# Patient Record
Sex: FEMALE | Race: BLACK OR AFRICAN AMERICAN | NOT HISPANIC OR LATINO | Employment: FULL TIME | URBAN - METROPOLITAN AREA
[De-identification: names, ages, dates, MRNs, and addresses within clinical notes are randomized per-mention and may not be internally consistent; named-entity substitution may affect disease eponyms.]

---

## 2022-01-12 PROBLEM — M54.12 CERVICAL RADICULOPATHY: Status: ACTIVE | Noted: 2022-01-12

## 2022-02-01 PROBLEM — I10 HYPERTENSION: Status: ACTIVE | Noted: 2022-02-01

## 2022-02-01 PROBLEM — E03.9 HYPOTHYROIDISM: Status: ACTIVE | Noted: 2022-02-01

## 2022-03-30 DIAGNOSIS — M48.02 SPINAL STENOSIS IN CERVICAL REGION: Primary | ICD-10-CM

## 2022-03-31 ENCOUNTER — CLINICAL SUPPORT (OUTPATIENT)
Dept: REHABILITATION | Facility: HOSPITAL | Age: 67
End: 2022-03-31
Payer: COMMERCIAL

## 2022-03-31 DIAGNOSIS — M48.02 SPINAL STENOSIS IN CERVICAL REGION: ICD-10-CM

## 2022-03-31 PROCEDURE — 97161 PT EVAL LOW COMPLEX 20 MIN: CPT

## 2022-03-31 PROCEDURE — 97110 THERAPEUTIC EXERCISES: CPT

## 2022-03-31 NOTE — PLAN OF CARE
[facility]   Physical Therapy Initial Evaluation     Date: 3/31/2022   Name: Stella Tariq  Clinic Number: [id number]    Therapy Diagnosis:   Encounter Diagnosis   Name Primary?    Spinal stenosis in cervical region      Physician: Cuong Almendarez    Physician Orders: PT Eval and Treat   Medical Diagnosis from Referral: M48.02 (ICD-10-CM) - Spinal stenosis in cervical region  Evaluation Date: 3/31/2022  Authorization Period Expiration: 5/1/22  Plan of Care Expiration: 5/31/22  Progress Note Due: 4/31/22  Visit # / Visits authorized: 1/ 1   FOTO: 1/5    Precautions: HTN, no lifting >10#    Time In: 2:15pm  Time Out: 3:00pm  Total Appointment Time (timed & untimed codes): 45 minutes      SUBJECTIVE   Date of onset: 2/1/22    History of current condition - Stella reports: she had C4-6 ACDF on Feb 1, 2022. She reports she still has some radicular pain in distal left upper extremity. Her arm hurts mostly at night and occasionally during the day. She has difficltuy with reaching overhead to dress herself. Her  [place] [employer] She is able to do some laundry, but not sweeping or mopping. She changes her bedding in steps to avoid overuse of her arms.     Falls: n/a    Imaging, none available    Prior Therapy: none  Social History:  lives with their spouse  Occupation: n/a  Prior Level of Function: (I)  Current Level of Function: difficulty with dressing, hygiene, ADLs, unable to drive at this time    Pain:  Current 2/10, worst 4/10, best 0/10   Location: left  UE  Description: Sharp and pulling  Aggravating Factors: reaching overhead/behind head  Easing Factors: rest    Patients goals: to be able to do more for myself and be more mobile.      Medical History:   Past Medical History:   Diagnosis Date    Arthritis     Breast cancer 2019    Cataract 2019/2021    bilateral extraction    Hypertension     Thyroid disease        Surgical History:   Stella Tariq  has a past surgical  history that includes Neck surgery; Hysterectomy; Cholecystectomy; Surgical removal of lymph node; Breast lumpectomy (2019); Bilateral tubal ligation; Eye surgery; Anterior cervical discectomy w/ fusion (N/A, 2/1/2022); and Bone graft (N/A, 2/1/2022).    Medications:   Stella has a current medication list which includes the following prescription(s): amlodipine, aspirin, ergocalciferol (vitamin d2), gabapentin, hydrochlorothiazide, ketorolac 0.5%, levothyroxine, lisinopril-hydrochlorothiazide, ofloxacin, and prednisolone acetate.    Allergies:   Review of patient's allergies indicates:  No Known Allergies     Objective     POSTURE    Postural examination/scapula alignment: Rounded shoulder, Head forward and Slouched posture          NEUROLOGICAL SCREENING     Palpation: TTP L posterior shoulder   Sensation: intact light touch BUE     Range of Motion/Strength:     CERVICAL AROM Pain/Dysfunction with Movement   Right rotation 60 deg Pain free   Left rotation 55 deg  Pain free     Thoracolumbar AROM Pain/Dysfunction with Movement   Right rotation 75% limited  Cervical pain    Left rotation 75% limited  Cervical pain      Shoulder Right MMT Left MMT Pain/Dysfunction with Movement (pain=!)   AROM        flexion  WFL 4-/5  WFL 3/5 Painful left upper extremity AROM and MMT            abduction  WFL 4-/5  WFL 3/5    Internal rotation  WFL 3+/5  WFL 3/5    ER at 0° abd  WFL 4-/5  WFL 3/5    rhomboids  3-/5  3-/5    Mid trap  3-/5  3-/5    Lower trap   3-/5  3-/5      Elbow Right MMT Left MMT Pain/Dysfunction with Movement (pain=!)   AROM        flexion  WFL 4-/5  WFL 3+/5    extension  WFL 4-/5  WFL 3+/5        CMS Impairment/Limitation/Restriction for FOTO Neck Survey    Therapist reviewed FOTO scores for Stella Tariq on 3/31/2022.   FOTO documents entered into EPIC - see Media section.    Limitation Score: 56%  Category: Mobility         TREATMENT     Total Treatment time (time-based codes) separate from Evaluation: 10  "minutes      Stella received the treatments listed below:      therapeutic exercises to develop strength, endurance, ROM, flexibility and posture for 10 minutes including:  HEP instruction & return demonstration     left upper extremity table slides  scap sets 5" holds  BUE external rotations with scap sets 5" holds     PATIENT EDUCATION AND HOME EXERCISES     Education provided:   - HEP  -purpose of PT    Written Home Exercises Provided: yes. Exercises were reviewed and Stella was able to demonstrate them prior to the end of the session.  Stella demonstrated good  understanding of the education provided. See EMR under Patient Instructions for exercises provided during therapy sessions.    ASSESSMENT     Stella is a 67 y.o. female referred to outpatient Physical Therapy with a medical diagnosis of spinal stenosis of cervical region s/p C4-6 ACDF. Patient presents with impaired resting posture, left upper extremity radicular pain and gross left upper extremity weakness affecting her functional mobility.     Patient prognosis is Good.   Patientt will benefit from skilled outpatient Physical Therapy to address the deficits stated above and in the chart below, provide patient /family education, and to maximize patientt's level of independence.     Plan of care discussed with patient: Yes  Patient's spiritual, cultural and educational needs considered and patient is agreeable to the plan of care and goals as stated below:     Anticipated Barriers for therapy: none    Medical Necessity is demonstrated by the following  History  Co-morbidities and personal factors that may impact the plan of care Co-morbidities:   see EMR    Personal Factors:   no deficits     low   Examination  Body Structures and Functions, activity limitations and participation restrictions that may impact the plan of care Body Regions:   neck    Body Systems:    gross symmetry  ROM  strength  gross coordinated movement  motor control  motor " learning    Participation Restrictions:   none    Activity limitations:   Learning and applying knowledge  no deficits    General Tasks and Commands  no deficits    Communication  no deficits    Mobility  lifting and carrying objects  fine hand use (grasping/picking up)    Self care  washing oneself (bathing, drying, washing hands)  caring for body parts (brushing teeth, shaving, grooming)  dressing    Domestic Life  shopping  cooking  doing house work (cleaning house, washing dishes, laundry)    Interactions/Relationships  family relationships    Life Areas  no deficits    Community and Social Life  recreation and leisure         moderate   Clinical Presentation stable and uncomplicated low   Decision Making/ Complexity Score: low     Goals:  Short Term Goals: 6 visits  1. Pt will be compliant /c HEP to supplement PT in order to improve functional tasks  2. Pt will improve B marti-scaps MMTs to 3/5 grossly to improve strength for functional activities  3. Pt will improve thoracic rotation B to lacking 50% for improved mobility with functional activities     Long Term Goals: 12 visits   1. Pt will improve B marti-scaps MMTs to 3+/5 grossly to improve strength for functional activities  2. Pt will improve FOTO score to </= 49% limitation to reduce perceived pain with mobility   3. Pt will improve thoracic rotation to lacking 25% for improved mobility with functional activities       PLAN   Plan of care Certification: 3/31/2022 to 6/31/22    Outpatient Physical Therapy 2 times weekly for  12 visit to include the following interventions: Manual Therapy, Moist Heat/ Ice, Neuromuscular Re-ed, Patient Education, Self Care, Therapeutic Activities and Therapeutic Exercise, E-stim & Dry Needling as needed.     Nga Mcfadden, PT      I CERTIFY THE NEED FOR THESE SERVICES FURNISHED UNDER THIS PLAN OF TREATMENT AND WHILE UNDER MY CARE   Physician's comments:     Physician's Signature:  ___________________________________________________

## 2022-04-05 ENCOUNTER — CLINICAL SUPPORT (OUTPATIENT)
Dept: REHABILITATION | Facility: HOSPITAL | Age: 67
End: 2022-04-05
Payer: COMMERCIAL

## 2022-04-05 DIAGNOSIS — R29.898 SHOULDER WEAKNESS: ICD-10-CM

## 2022-04-05 DIAGNOSIS — R29.3 POSTURE ABNORMALITY: ICD-10-CM

## 2022-04-05 DIAGNOSIS — M54.12 CERVICAL RADICULOPATHY: ICD-10-CM

## 2022-04-05 PROCEDURE — 97140 MANUAL THERAPY 1/> REGIONS: CPT

## 2022-04-05 PROCEDURE — 97110 THERAPEUTIC EXERCISES: CPT

## 2022-04-05 NOTE — PROGRESS NOTES
"[facility]   Physical Therapy Treatment Note     Name: Stella Tariq  Clinic Number: [id number]    Therapy Diagnosis:   Encounter Diagnoses   Name Primary?    Cervical radiculopathy     Posture abnormality     Shoulder weakness      Physician: Cuong Almendarez    Visit Date: 4/5/2022    Physician Orders: PT Eval and Treat   Medical Diagnosis from Referral: M48.02 (ICD-10-CM) - Spinal stenosis in cervical region  Evaluation Date: 3/31/2022  Authorization Period Expiration: 5/1/22  Plan of Care Expiration: 5/31/22  Progress Note Due: 4/31/22  Visit # / Visits authorized: 2/20  FOTO: 2/5     Precautions: HTN, no lifting >10#    PTA Visit #: 0/5     Time In: 9:46am  Time Out: 10:31am  Total Billable Time: 45 minutes    SUBJECTIVE     Pt reports: she is still having some difficulty sleeping, but it is getting better. She had no trouble with her HEP  She was compliant with home exercise program.  Response to previous treatment: no adverse effects  Functional change: none    Pain: 2/10  Location: bilateral shoulders (marti-scap)      OBJECTIVE     Objective Measures updated at progress report unless specified.     Treatment     Stella received the treatments listed below:      therapeutic exercises to develop strength, endurance, ROM, flexibility and posture for 35 minutes including:    UE pulleys fle/abd 3' ea  fwd upper extremity table slides x15 B   scap sets 5" holds  BUE external rotations with scap sets 5" holds   Supine pec stretch 2'   Supine snow angels B x10   Supine SA punch x15 B   upper trap stretch B 20" x1    Next: Tball roll outs    manual therapy techniques:  were applied  for 10 minutes, including:  TPR B levator scaps/ mid traps        Patient Education and Home Exercises     Home Exercises Provided and Patient Education Provided     Education provided:   - HEP    Written Home Exercises Provided: yes. Exercises were reviewed and Stella was able to demonstrate them prior " to the end of the session.  Stella demonstrated good  understanding of the education provided. See EMR under Patient Instructions for exercises provided during therapy sessions    ASSESSMENT     Pt tolerated first full PT session well. PT added B shoulder range of motion and marti-scap strengthening therex for improved resting posture, which pt did demonstrate at end of session. Pt c/o posterior neck pulling pain with attempt at R upper trap stretch. Following manual trigger point release, she reported reduced pain/tension.     Stella Is progressing well towards her goals.   Pt prognosis is Good.     Pt will continue to benefit from skilled outpatient physical therapy to address the deficits listed in the problem list box on initial evaluation, provide pt/family education and to maximize pt's level of independence in the home and community environment.     Pt's spiritual, cultural and educational needs considered and pt agreeable to plan of care and goals.     Anticipated barriers to physical therapy: none    Goals: Short Term Goals: 6 visits  1. Pt will be compliant /c HEP to supplement PT in order to improve functional tasks  2. Pt will improve B marti-scaps MMTs to 3/5 grossly to improve strength for functional activities  3. Pt will improve thoracic rotation B to lacking 50% for improved mobility with functional activities      Long Term Goals: 12 visits   1. Pt will improve B marti-scaps MMTs to 3+/5 grossly to improve strength for functional activities  2. Pt will improve FOTO score to </= 49% limitation to reduce perceived pain with mobility   3. Pt will improve thoracic rotation to lacking 25% for improved mobility with functional activities     PLAN     Plan of care Certification: 3/31/2022 to 6/31/22     Outpatient Physical Therapy 2 times weekly for  12 visit to include the following interventions: Manual Therapy, Moist Heat/ Ice, Neuromuscular Re-ed, Patient Education, Self Care, Therapeutic Activities and  Therapeutic Exercise, E-stim & Dry Needling as needed.     Nga Mcfadden, PT

## 2022-04-07 ENCOUNTER — CLINICAL SUPPORT (OUTPATIENT)
Dept: REHABILITATION | Facility: HOSPITAL | Age: 67
End: 2022-04-07
Payer: COMMERCIAL

## 2022-04-07 DIAGNOSIS — R29.3 POSTURE ABNORMALITY: Primary | ICD-10-CM

## 2022-04-07 DIAGNOSIS — R29.898 SHOULDER WEAKNESS: ICD-10-CM

## 2022-04-07 PROCEDURE — 97110 THERAPEUTIC EXERCISES: CPT

## 2022-04-07 PROCEDURE — 97140 MANUAL THERAPY 1/> REGIONS: CPT

## 2022-04-07 NOTE — PROGRESS NOTES
"[facility]   Physical Therapy Treatment Note     Name: Stella Tariq  Clinic Number: [id number]    Therapy Diagnosis:   Encounter Diagnoses   Name Primary?    Posture abnormality Yes    Shoulder weakness      Physician: Cuong Almendarez    Visit Date: 4/7/2022    Physician Orders: PT Eval and Treat   Medical Diagnosis from Referral: M48.02 (ICD-10-CM) - Spinal stenosis in cervical region  Evaluation Date: 3/31/2022  Authorization Period Expiration: 5/1/22  Plan of Care Expiration: 5/31/22  Progress Note Due: 4/31/22  Visit # / Visits authorized: 2/20  FOTO: 2/5     Precautions: HTN, no lifting >10#    PTA Visit #: 0/5     Time In: 10:50 am  Time Out: 11:30 am  Total Billable Time: 40 minutes    SUBJECTIVE     Pt reports: min soreness after last PT session. Pt continues to report radicular pain along the L UE.   She was compliant with home exercise program.  Response to previous treatment: no adverse effects  Functional change: none    Pain: 5/10  Location: L UE    OBJECTIVE     Objective Measures updated at progress report unless specified.     Treatment     Stella received the treatments listed below:      therapeutic exercises to develop strength, endurance, ROM, flexibility and posture for 30 minutes including:    UE pulleys fle/abd 3' ea  Rows orange TB 10 X 2  Shoulder extension orange TB 10 X 2  BUE external rotations with scap sets 5" holds X 10  Supine pec stretch on half foam 2'   Supine snow angels B x15  Supine SA punch x20 B   upper trap stretch B 30" x 3  Tball roll outs 10'' X 5    manual therapy techniques:  were applied  for 10 minutes, including:  STM to cervical paraspinals, UT's, and LS's   TPR B levator scaps/ mid traps    Patient Education and Home Exercises     Home Exercises Provided and Patient Education Provided     Education provided:   - HEP    Written Home Exercises Provided: yes. Exercises were reviewed and Stella was able to demonstrate them prior to the " end of the session.  Stella demonstrated good  understanding of the education provided. See EMR under Patient Instructions for exercises provided during therapy sessions    ASSESSMENT     Pt was able to tolerate additional UE strengthening exercises as well as increased number of reps with already prescribed exercises without report of increase in pain or strain. Pt required VC and visual demonstration for all new exercises in order to ensure proper form necessary for increasing strengthen and flexibility. Mod tone noted along cervical spine during STM.     Stella Is progressing well towards her goals.   Pt prognosis is Good.     Pt will continue to benefit from skilled outpatient physical therapy to address the deficits listed in the problem list box on initial evaluation, provide pt/family education and to maximize pt's level of independence in the home and community environment.     Pt's spiritual, cultural and educational needs considered and pt agreeable to plan of care and goals.     Anticipated barriers to physical therapy: none    Goals: Short Term Goals: 6 visits  1. Pt will be compliant /c HEP to supplement PT in order to improve functional tasks  2. Pt will improve B marti-scaps MMTs to 3/5 grossly to improve strength for functional activities  3. Pt will improve thoracic rotation B to lacking 50% for improved mobility with functional activities      Long Term Goals: 12 visits   1. Pt will improve B marti-scaps MMTs to 3+/5 grossly to improve strength for functional activities  2. Pt will improve FOTO score to </= 49% limitation to reduce perceived pain with mobility   3. Pt will improve thoracic rotation to lacking 25% for improved mobility with functional activities     PLAN     Plan of care Certification: 3/31/2022 to 6/31/22     Outpatient Physical Therapy 2 times weekly for  12 visit to include the following interventions: Manual Therapy, Moist Heat/ Ice, Neuromuscular Re-ed, Patient Education, Self  Care, Therapeutic Activities and Therapeutic Exercise, E-stim & Dry Needling as needed.     Rubén Peralta, PT

## 2022-04-11 ENCOUNTER — CLINICAL SUPPORT (OUTPATIENT)
Dept: REHABILITATION | Facility: HOSPITAL | Age: 67
End: 2022-04-11
Payer: COMMERCIAL

## 2022-04-11 DIAGNOSIS — R29.3 POSTURE ABNORMALITY: Primary | ICD-10-CM

## 2022-04-11 DIAGNOSIS — R29.898 SHOULDER WEAKNESS: ICD-10-CM

## 2022-04-11 PROCEDURE — 97110 THERAPEUTIC EXERCISES: CPT

## 2022-04-11 PROCEDURE — 97140 MANUAL THERAPY 1/> REGIONS: CPT

## 2022-04-11 NOTE — PROGRESS NOTES
"[facility]   Physical Therapy Treatment Note     Name: Stella Tariq  Clinic Number: [id number]    Therapy Diagnosis:   No diagnosis found.  Physician: Cuong Almendarez    Visit Date: 4/11/2022    Physician Orders: PT Eval and Treat   Medical Diagnosis from Referral: M48.02 (ICD-10-CM) - Spinal stenosis in cervical region  Evaluation Date: 3/31/2022  Authorization Period Expiration: 5/1/22  Plan of Care Expiration: 5/31/22  Progress Note Due: 4/31/22  Visit # / Visits authorized: 3/20  FOTO: 3/5     Precautions: HTN, no lifting >10#    PTA Visit #: 0/5     Time In: 10:32 am  Time Out: 11:17am  Total Billable Time: 45 minutes    SUBJECTIVE     Pt reports: she felt much better after the manual treatment last session   She was compliant with home exercise program.  Response to previous treatment: no adverse effects  Functional change: none    Pain: 5/10  Location: L UE    OBJECTIVE     Objective Measures updated at progress report unless specified.     Treatment     Stella received the treatments listed below:      therapeutic exercises to develop strength, endurance, ROM, flexibility and posture for 30  minutes including:    UE pulleys fle/abd 3' ea  Rows orange TB 10 X 2  Shoulder extension orange TB 10 X 2  Supine SA punch 2x10  B   Supine bear hugs x10    not performed    BUE external rotations with scap sets 5" holds X 10  Supine pec stretch on half foam 2'   Supine snow angels B x15  upper trap stretch B 30" x 3  Tball roll outs 10'' X 5    manual therapy techniques:  were applied  for 15 minutes, including:  STM to cervical paraspinals, UT's, and LS's   TPR B levator scaps/ mid traps    Patient Education and Home Exercises     Home Exercises Provided and Patient Education Provided     Education provided:   - HEP  -log roll technique with bed mobility     Written Home Exercises Provided: yes. Exercises were reviewed and Stella was able to demonstrate them prior to the end of the " session.  Stella demonstrated good  understanding of the education provided. See EMR under Patient Instructions for exercises provided during therapy sessions    ASSESSMENT     Pt presents with hypertonicity of B upper trap and mid traps, although subjectively reduced tension since last visit. PT added bear hugs and pt required verbal/tactile cues for proper form with supine SA punches, for improve resting posture. She required education on log roll technique to avoid excessive strain on cervical flexors with bed mobility. Pt continues to c/o cervical pain with attempted R upper trap stretch (L sidebending), therefore this was avoided. She reported reduced cervical tension following manual treatment today.     Stella Is progressing well towards her goals.   Pt prognosis is Good.     Pt will continue to benefit from skilled outpatient physical therapy to address the deficits listed in the problem list box on initial evaluation, provide pt/family education and to maximize pt's level of independence in the home and community environment.     Pt's spiritual, cultural and educational needs considered and pt agreeable to plan of care and goals.     Anticipated barriers to physical therapy: none    Goals: Short Term Goals: 6 visits  1. Pt will be compliant /c HEP to supplement PT in order to improve functional tasks  2. Pt will improve B marti-scaps MMTs to 3/5 grossly to improve strength for functional activities  3. Pt will improve thoracic rotation B to lacking 50% for improved mobility with functional activities      Long Term Goals: 12 visits   1. Pt will improve B marti-scaps MMTs to 3+/5 grossly to improve strength for functional activities  2. Pt will improve FOTO score to </= 49% limitation to reduce perceived pain with mobility   3. Pt will improve thoracic rotation to lacking 25% for improved mobility with functional activities     PLAN     Plan of care Certification: 3/31/2022 to 6/31/22     Outpatient Physical  Therapy 2 times weekly for  12 visit to include the following interventions: Manual Therapy, Moist Heat/ Ice, Neuromuscular Re-ed, Patient Education, Self Care, Therapeutic Activities and Therapeutic Exercise, E-stim & Dry Needling as needed.     Nga Mcfadden, PT

## 2022-04-13 ENCOUNTER — CLINICAL SUPPORT (OUTPATIENT)
Dept: REHABILITATION | Facility: HOSPITAL | Age: 67
End: 2022-04-13
Payer: COMMERCIAL

## 2022-04-13 DIAGNOSIS — R29.898 SHOULDER WEAKNESS: ICD-10-CM

## 2022-04-13 DIAGNOSIS — R29.3 POSTURE ABNORMALITY: Primary | ICD-10-CM

## 2022-04-13 PROCEDURE — 97110 THERAPEUTIC EXERCISES: CPT | Mod: CQ

## 2022-04-13 PROCEDURE — 97140 MANUAL THERAPY 1/> REGIONS: CPT | Mod: CQ

## 2022-04-13 NOTE — PROGRESS NOTES
"[facility]   Physical Therapy Treatment Note     Name: Stella Tariq  Clinic Number: [id number]    Therapy Diagnosis:   No diagnosis found.  Physician: Cuong Almendarez    Visit Date: 4/13/2022    Physician Orders: PT Eval and Treat   Medical Diagnosis from Referral: M48.02 (ICD-10-CM) - Spinal stenosis in cervical region  Evaluation Date: 3/31/2022  Authorization Period Expiration: 5/1/22  Plan of Care Expiration: 5/31/22  Progress Note Due: 4/31/22  Visit # / Visits authorized: 3/20  FOTO: 3/5     Precautions: HTN, no lifting >10#    PTA Visit #: 1/5     Time In: 930 am  Time Out:1015 am  Total Billable Time: 45 minutes    SUBJECTIVE     Pt reports: she doesn't have soreness after therapy like she used to.  She was compliant with home exercise program.  Response to previous treatment: no adverse effects  Functional change: none    Pain: 0/10  Location: L UE    OBJECTIVE     Objective Measures updated at progress report unless specified.     Treatment     Stella received the treatments listed below:      therapeutic exercises to develop strength, endurance, ROM, flexibility and posture for 30  minutes including:  UBE 2 min <>  UE pulleys fle/abd 3' ea  Rows orange TB 10 X 2  Shoulder extension orange TB 10 X 2  Supine SA punch 2x10  B   Supine bear hugs x10  BUE external rotations with scap sets 5" holds X 10  Supine pec stretch on half foam 2'     not performed    Supine snow angels B x15  upper trap stretch B 30" x 3   Tball roll outs 10'' X 5    manual therapy techniques:  were applied  for 15 minutes, including:  STM to cervical paraspinals, UT's, and LS's   TPR B levator scaps/ mid traps    Patient Education and Home Exercises     Home Exercises Provided and Patient Education Provided     Education provided:   - HEP  -log roll technique with bed mobility     Written Home Exercises Provided: yes. Exercises were reviewed and Stella was able to demonstrate them prior to the end of " the session.  Stella demonstrated good  understanding of the education provided. See EMR under Patient Instructions for exercises provided during therapy sessions    ASSESSMENT   Pt presents to clinic this date with continued B UT, LS hypertrophy and dec'd cervical tissue extensibility. UT stretches again were deferred due to inc'd pain levels, although she was able to tolerate gentle manual UT stretch. Exercises not progressed today. She did require vc to perform exercises due to diminished memory of therex program. Supine pect stretch reintroduced in order to improve resting posture and B ER to improve posture as well and periscapular strength.      Stella Is progressing well towards her goals.   Pt prognosis is Good.     Pt will continue to benefit from skilled outpatient physical therapy to address the deficits listed in the problem list box on initial evaluation, provide pt/family education and to maximize pt's level of independence in the home and community environment.     Pt's spiritual, cultural and educational needs considered and pt agreeable to plan of care and goals.     Anticipated barriers to physical therapy: none    Goals: Short Term Goals: 6 visits  1. Pt will be compliant /c HEP to supplement PT in order to improve functional tasks  2. Pt will improve B marti-scaps MMTs to 3/5 grossly to improve strength for functional activities  3. Pt will improve thoracic rotation B to lacking 50% for improved mobility with functional activities      Long Term Goals: 12 visits   1. Pt will improve B marti-scaps MMTs to 3+/5 grossly to improve strength for functional activities  2. Pt will improve FOTO score to </= 49% limitation to reduce perceived pain with mobility   3. Pt will improve thoracic rotation to lacking 25% for improved mobility with functional activities     PLAN     Plan of care Certification: 3/31/2022 to 6/31/22     Outpatient Physical Therapy 2 times weekly for  12 visit to include the following  interventions: Manual Therapy, Moist Heat/ Ice, Neuromuscular Re-ed, Patient Education, Self Care, Therapeutic Activities and Therapeutic Exercise, E-stim & Dry Needling as needed.     Ab Cheney, PTA

## 2022-04-14 ENCOUNTER — DOCUMENTATION ONLY (OUTPATIENT)
Dept: REHABILITATION | Facility: HOSPITAL | Age: 67
End: 2022-04-14
Payer: COMMERCIAL

## 2022-04-14 NOTE — PROGRESS NOTES
PT/PTA met face to face to discuss pt's treatment plan and progress towards established goals. Pt will be seen by a physical therapist minimally every 6th visit or every 30 days.    Ab Cheney PTA     Face to Face PTA Conference performed with Jaz Cedillo PTA and Ab Cheney PTA regarding patient's current status, overall progress, and plan of care.    Nga Mcfadden, PT

## 2022-04-20 ENCOUNTER — CLINICAL SUPPORT (OUTPATIENT)
Dept: REHABILITATION | Facility: HOSPITAL | Age: 67
End: 2022-04-20
Payer: COMMERCIAL

## 2022-04-20 DIAGNOSIS — R29.898 SHOULDER WEAKNESS: ICD-10-CM

## 2022-04-20 DIAGNOSIS — R29.3 POSTURE ABNORMALITY: Primary | ICD-10-CM

## 2022-04-20 PROCEDURE — 97110 THERAPEUTIC EXERCISES: CPT | Mod: CQ

## 2022-04-20 PROCEDURE — 97140 MANUAL THERAPY 1/> REGIONS: CPT | Mod: CQ

## 2022-04-20 NOTE — PROGRESS NOTES
"[facility]   Physical Therapy Treatment Note     Name: Stella Tariq  Clinic Number: [id number]    Therapy Diagnosis:   Encounter Diagnoses   Name Primary?    Posture abnormality Yes    Shoulder weakness      Physician: Cuong Almendarez    Visit Date: 4/20/2022    Physician Orders: PT Eval and Treat   Medical Diagnosis from Referral: M48.02 (ICD-10-CM) - Spinal stenosis in cervical region  Evaluation Date: 3/31/2022  Authorization Period Expiration: 5/1/22  Plan of Care Expiration: 5/31/22  Progress Note Due: 4/31/22  Visit # / Visits authorized: 3/20  FOTO: 3/5     Precautions: HTN, no lifting >10#    PTA Visit #: 1/5     Time In: 1100 am  Time Out: 1142 am  Total Billable Time: 42 minutes    SUBJECTIVE     Pt reports: she is feeling good today.   She was compliant with home exercise program.  Response to previous treatment: better  Functional change: none    Pain: 0/10  Location: L UE    OBJECTIVE     Objective Measures updated at progress report unless specified.     Treatment     Stella received the treatments listed below:      therapeutic exercises to develop strength, endurance, ROM, flexibility and posture for 25  minutes including:  UBE 2 min <>  UE pulleys fle/abd 3' ea  Rows orange TB 10 X 2  Shoulder extension orange TB 10 X 2  Supine SA punch 1# 2x10  B   Supine bear hugs x10  BUE external rotations with scap sets 5" holds X 10  Supine pec stretch on half foam 2'   Supine snow angels B x15    not performed    Tball roll outs 10'' X 5    manual therapy techniques:  were applied  for 15 minutes, including:  STM to cervical paraspinals, UT's, and LS's   TPR B levator scaps/ mid traps  upper trap stretch B 30" x 3   Patient Education and Home Exercises     Home Exercises Provided and Patient Education Provided     Education provided:   - HEP  -log roll technique with bed mobility     Written Home Exercises Provided: yes. Exercises were reviewed and Stella was able to " demonstrate them prior to the end of the session.  Stella demonstrated good  understanding of the education provided. See EMR under Patient Instructions for exercises provided during therapy sessions    ASSESSMENT   Pt presents to clinic this date with subjective reports of no neck pain. Exercises continued to work on cervical strength, extensibility. Dec'd hypertonicity noted in B UT this date. Reintroduced snow angels for periscapular strength. Pt tolerated session without adverse affect.      Stella Is progressing well towards her goals.   Pt prognosis is Good.     Pt will continue to benefit from skilled outpatient physical therapy to address the deficits listed in the problem list box on initial evaluation, provide pt/family education and to maximize pt's level of independence in the home and community environment.     Pt's spiritual, cultural and educational needs considered and pt agreeable to plan of care and goals.     Anticipated barriers to physical therapy: none    Goals: Short Term Goals: 6 visits  1. Pt will be compliant /c HEP to supplement PT in order to improve functional tasks  2. Pt will improve B marti-scaps MMTs to 3/5 grossly to improve strength for functional activities  3. Pt will improve thoracic rotation B to lacking 50% for improved mobility with functional activities      Long Term Goals: 12 visits   1. Pt will improve B marti-scaps MMTs to 3+/5 grossly to improve strength for functional activities  2. Pt will improve FOTO score to </= 49% limitation to reduce perceived pain with mobility   3. Pt will improve thoracic rotation to lacking 25% for improved mobility with functional activities     PLAN     Plan of care Certification: 3/31/2022 to 6/31/22     Outpatient Physical Therapy 2 times weekly for  12 visit to include the following interventions: Manual Therapy, Moist Heat/ Ice, Neuromuscular Re-ed, Patient Education, Self Care, Therapeutic Activities and Therapeutic Exercise, E-stim & Dry  Needling as needed.     Ab Cheney, PTA

## 2022-04-22 NOTE — PROGRESS NOTES
"[facility]   Physical Therapy Treatment Note     Name: Stella Tariq  Clinic Number: [id number]    Therapy Diagnosis:   Encounter Diagnoses   Name Primary?    Posture abnormality Yes    Shoulder weakness      Physician: Cuong Almendarez    Visit Date: 4/25/2022    Physician Orders: PT Eval and Treat   Medical Diagnosis from Referral: M48.02 (ICD-10-CM) - Spinal stenosis in cervical region  Evaluation Date: 3/31/2022  Authorization Period Expiration: 5/1/22  Plan of Care Expiration: 5/31/22  Progress Note Due: 4/31/22  Visit # / Visits authorized: 5/20  FOTO: 4/5     Precautions: HTN, no lifting >10#    PTA Visit #: 2/5     Time In: 10:00 am  Time Out: 10:42 am  Total Billable Time: 42 minutes    SUBJECTIVE     Pt reports: that she is not having any pain, however just some soreness in her left arm and her legs. Pt reports that she has not resumed driving yet, however has been doing some light house work.   She was compliant with home exercise program.  Response to previous treatment: better  Functional change: none    Pain: 0/10  Location: L UE    OBJECTIVE     Objective Measures updated at progress report unless specified.     Treatment     Stella received the treatments listed below:      therapeutic exercises to develop strength, endurance, ROM, flexibility and posture for 30 minutes including:  UBE 2 min <>  UE pulleys fle/abd 3' ea  Rows orange TB 10 X 2  Shoulder extension orange TB 10 X 2  BUE external rotations with scap sets 5" holds X 10 c/ OTB   Supine SA punch 1# 2x10  B   Supine HSA c/ OTB 2x10       Supine bear hugs x10  Supine pec stretch on half foam 2'   Supine snow angels B x15    not performed    Tball roll outs 10'' X 5    manual therapy techniques:  were applied  for 12 minutes, including:  STM to cervical paraspinals, UT's, and LS's   TPR B levator scaps/ mid traps  upper trap stretch B 30" x 3 NP  Patient Education and Home Exercises     Home Exercises Provided " and Patient Education Provided     Education provided:   - HEP  -log roll technique with bed mobility     Written Home Exercises Provided: yes. Exercises were reviewed and Stella was able to demonstrate them prior to the end of the session.  Stella demonstrated good  understanding of the education provided. See EMR under Patient Instructions for exercises provided during therapy sessions    ASSESSMENT   Pt exhibited tolerance to all established therex performed this session, as well as incorporated new exercise of horizontal shoulder abduction in order to continue to work towards increasing UE strength. Pt with no complaints of increased pain during therapy session. Pt received verbal and tactile cues for proper scapular setting and maintaining proper elbow extension with appropriate exercises. Pt with hypertonicity throughout cervical and shoulder musculature with minimal difference noted after manual therapy techniques. Will continue to monitor pt's response to therapy sessions and progress per her tolerance and POC.        Stella Is progressing well towards her goals.   Pt prognosis is Good.     Pt will continue to benefit from skilled outpatient physical therapy to address the deficits listed in the problem list box on initial evaluation, provide pt/family education and to maximize pt's level of independence in the home and community environment.     Pt's spiritual, cultural and educational needs considered and pt agreeable to plan of care and goals.     Anticipated barriers to physical therapy: none    Goals: Short Term Goals: 6 visits  1. Pt will be compliant /c HEP to supplement PT in order to improve functional tasks  2. Pt will improve B marti-scaps MMTs to 3/5 grossly to improve strength for functional activities  3. Pt will improve thoracic rotation B to lacking 50% for improved mobility with functional activities      Long Term Goals: 12 visits   1. Pt will improve B marti-scaps MMTs to 3+/5 grossly to  improve strength for functional activities  2. Pt will improve FOTO score to </= 49% limitation to reduce perceived pain with mobility   3. Pt will improve thoracic rotation to lacking 25% for improved mobility with functional activities     PLAN     Plan of care Certification: 3/31/2022 to 6/31/22     Outpatient Physical Therapy 2 times weekly for  12 visit to include the following interventions: Manual Therapy, Moist Heat/ Ice, Neuromuscular Re-ed, Patient Education, Self Care, Therapeutic Activities and Therapeutic Exercise, E-stim & Dry Needling as needed.     Jaz Cedillo, PTA

## 2022-04-25 ENCOUNTER — CLINICAL SUPPORT (OUTPATIENT)
Dept: REHABILITATION | Facility: HOSPITAL | Age: 67
End: 2022-04-25
Payer: COMMERCIAL

## 2022-04-25 DIAGNOSIS — R29.3 POSTURE ABNORMALITY: Primary | ICD-10-CM

## 2022-04-25 DIAGNOSIS — R29.898 SHOULDER WEAKNESS: ICD-10-CM

## 2022-04-25 PROCEDURE — 97140 MANUAL THERAPY 1/> REGIONS: CPT | Mod: CQ

## 2022-04-25 PROCEDURE — 97110 THERAPEUTIC EXERCISES: CPT | Mod: CQ

## 2022-04-27 ENCOUNTER — CLINICAL SUPPORT (OUTPATIENT)
Dept: REHABILITATION | Facility: HOSPITAL | Age: 67
End: 2022-04-27
Payer: COMMERCIAL

## 2022-04-27 DIAGNOSIS — R29.3 POSTURE ABNORMALITY: Primary | ICD-10-CM

## 2022-04-27 DIAGNOSIS — R29.898 SHOULDER WEAKNESS: ICD-10-CM

## 2022-04-27 PROCEDURE — 97110 THERAPEUTIC EXERCISES: CPT | Mod: CQ

## 2022-04-27 PROCEDURE — 97140 MANUAL THERAPY 1/> REGIONS: CPT | Mod: CQ

## 2022-04-27 NOTE — PROGRESS NOTES
"[facility]   Physical Therapy Treatment Note     Name: Stella Tariq  Clinic Number: [id number]    Therapy Diagnosis:   Encounter Diagnoses   Name Primary?    Posture abnormality Yes    Shoulder weakness      Physician: Cuong Almendarez    Visit Date: 4/27/2022    Physician Orders: PT Eval and Treat   Medical Diagnosis from Referral: M48.02 (ICD-10-CM) - Spinal stenosis in cervical region  Evaluation Date: 3/31/2022  Authorization Period Expiration: 5/1/22  Plan of Care Expiration: 5/31/22  Progress Note Due: 4/31/22  Visit # / Visits authorized: 5/20  FOTO: 4/5     Precautions: HTN, no lifting >10#    PTA Visit #: 2/5     Time In: 1055 am  Time Out: 1140 am  Total Billable Time: 45 minutes    SUBJECTIVE     Pt reports: no new complaints.  She was compliant with home exercise program.  Response to previous treatment: better  Functional change: none    Pain: 0/10  Location: L UE    OBJECTIVE     Objective Measures updated at progress report unless specified.     Treatment     Stella received the treatments listed below:      therapeutic exercises to develop strength, endurance, ROM, flexibility and posture for 33 minutes including:  UBE 2 min <>  UE pulleys fle/abd 3' ea  Rows GTB 10 X 2  Shoulder extension GTB 10 X 2  BUE external rotations with scap sets 5" holds X 10 c/ OTB   Supine SA punch 1# 2x10  B   Supine HSA c/ OTB 2x10   Supine bear hugs x10  Supine pec stretch on half foam 2'   Supine snow angels B x15    not performed    Tball roll outs 10'' X 5    manual therapy techniques:  were applied  for 12 minutes, including:  STM to cervical paraspinals, UT's, and LS's   TPR B levator scaps/ mid traps  upper trap stretch B 30" x 3 NP  Patient Education and Home Exercises     Home Exercises Provided and Patient Education Provided     Education provided:   - HEP  -log roll technique with bed mobility     Written Home Exercises Provided: Patient instructed to cont prior HEP. " Exercises were reviewed and Stella was able to demonstrate them prior to the end of the session.  Stella demonstrated good  understanding of the education provided. See EMR under Patient Instructions for exercises provided during therapy sessions    ASSESSMENT   Pt comes to therapy session with continued B UT hypertonicity. Pt showed good tolerance to recently implemented periscapular strengthening exercises. Pt shows better scapular setting with appropriate exercises. Pt with no complaints of increased pain during therapy session.       Stella Is progressing well towards her goals.   Pt prognosis is Good.     Pt will continue to benefit from skilled outpatient physical therapy to address the deficits listed in the problem list box on initial evaluation, provide pt/family education and to maximize pt's level of independence in the home and community environment.     Pt's spiritual, cultural and educational needs considered and pt agreeable to plan of care and goals.     Anticipated barriers to physical therapy: none    Goals: Short Term Goals: 6 visits  1. Pt will be compliant /c HEP to supplement PT in order to improve functional tasks  2. Pt will improve B marti-scaps MMTs to 3/5 grossly to improve strength for functional activities  3. Pt will improve thoracic rotation B to lacking 50% for improved mobility with functional activities      Long Term Goals: 12 visits   1. Pt will improve B marti-scaps MMTs to 3+/5 grossly to improve strength for functional activities  2. Pt will improve FOTO score to </= 49% limitation to reduce perceived pain with mobility   3. Pt will improve thoracic rotation to lacking 25% for improved mobility with functional activities     PLAN     Plan of care Certification: 3/31/2022 to 6/31/22     Outpatient Physical Therapy 2 times weekly for  12 visit to include the following interventions: Manual Therapy, Moist Heat/ Ice, Neuromuscular Re-ed, Patient Education, Self Care, Therapeutic  Activities and Therapeutic Exercise, E-stim & Dry Needling as needed.     Ab Cheney, PTA

## 2022-05-04 ENCOUNTER — CLINICAL SUPPORT (OUTPATIENT)
Dept: REHABILITATION | Facility: HOSPITAL | Age: 67
End: 2022-05-04
Payer: COMMERCIAL

## 2022-05-04 DIAGNOSIS — R29.3 POSTURE ABNORMALITY: Primary | ICD-10-CM

## 2022-05-04 DIAGNOSIS — R29.898 SHOULDER WEAKNESS: ICD-10-CM

## 2022-05-04 PROCEDURE — 97110 THERAPEUTIC EXERCISES: CPT | Mod: CQ

## 2022-05-04 PROCEDURE — 97140 MANUAL THERAPY 1/> REGIONS: CPT | Mod: CQ

## 2022-05-04 NOTE — PROGRESS NOTES
"[facility]   Physical Therapy Treatment Note     Name: Stella Tariq  Clinic Number: [id number]    Therapy Diagnosis:   Encounter Diagnoses   Name Primary?    Posture abnormality Yes    Shoulder weakness      Physician: Cuong Almendarez    Visit Date: 5/4/2022    Physician Orders: PT Eval and Treat   Medical Diagnosis from Referral: M48.02 (ICD-10-CM) - Spinal stenosis in cervical region  Evaluation Date: 3/31/2022  Authorization Period Expiration: 5/1/22  Plan of Care Expiration: 5/31/22  Progress Note Due: 4/31/22  Visit # / Visits authorized: 5/20  FOTO: 4/5     Precautions: HTN, no lifting >10#    PTA Visit #: 2/5     Time In: 915 am  Time Out: 1000 am  Total Billable Time: 45 minutes    SUBJECTIVE     Pt reports: no new complaints.  She was compliant with home exercise program.  Response to previous treatment: better  Functional change: none    Pain: 0/10  Location: L UE    OBJECTIVE     Objective Measures updated at progress report unless specified.     Treatment     tSella received the treatments listed below:      therapeutic exercises to develop strength, endurance, ROM, flexibility and posture for 37 minutes including:  UBE 2 min <>  Rows GTB 10 X 2  Shoulder extension GTB 10 X 2  BUE external rotations with scap sets 5" holds X 10 c/ OTB   Supine SA punch 1# 2x10  B   Supine HSA c/ OTB 2x10   Supine bear hugs x10  Supine pec stretch on half foam 2'   Supine snow angels B x15  Supine Chin tuck lift x 10    not performed    Tball roll outs 10'' X 5  UE pulleys fle/abd 3' ea    manual therapy techniques:  were applied for 8 minutes, including:  STM to cervical paraspinals, UT's, and LS's   TPR B levator scaps/ mid traps  upper trap stretch B 30" x 3 NP  Patient Education and Home Exercises     Home Exercises Provided and Patient Education Provided     Education provided:   - HEP      Written Home Exercises Provided: Patient instructed to cont prior HEP. Exercises were " reviewed and Stella was able to demonstrate them prior to the end of the session.  Stella demonstrated good  understanding of the education provided. See EMR under Patient Instructions for exercises provided during therapy sessions    ASSESSMENT   Pt comes to therapy session with dec'd subjective c/o cervical pain. Pt continues to tolerate therapeutic exercises implemented for periscapular strength. Cervical retractions in supine with lifts added this day to strengthen cervical paraspinals. Pt with no complaints of increased pain during therapy session.       Stella Is progressing well towards her goals.   Pt prognosis is Good.     Pt will continue to benefit from skilled outpatient physical therapy to address the deficits listed in the problem list box on initial evaluation, provide pt/family education and to maximize pt's level of independence in the home and community environment.     Pt's spiritual, cultural and educational needs considered and pt agreeable to plan of care and goals.     Anticipated barriers to physical therapy: none    Goals: Short Term Goals: 6 visits  1. Pt will be compliant /c HEP to supplement PT in order to improve functional tasks  2. Pt will improve B marti-scaps MMTs to 3/5 grossly to improve strength for functional activities  3. Pt will improve thoracic rotation B to lacking 50% for improved mobility with functional activities      Long Term Goals: 12 visits   1. Pt will improve B marti-scaps MMTs to 3+/5 grossly to improve strength for functional activities  2. Pt will improve FOTO score to </= 49% limitation to reduce perceived pain with mobility   3. Pt will improve thoracic rotation to lacking 25% for improved mobility with functional activities     PLAN     Plan of care Certification: 3/31/2022 to 6/31/22     Outpatient Physical Therapy 2 times weekly for  12 visit to include the following interventions: Manual Therapy, Moist Heat/ Ice, Neuromuscular Re-ed, Patient Education, Self  Care, Therapeutic Activities and Therapeutic Exercise, E-stim & Dry Needling as needed.     Ab Cheney, PTA

## 2022-05-06 ENCOUNTER — CLINICAL SUPPORT (OUTPATIENT)
Dept: REHABILITATION | Facility: HOSPITAL | Age: 67
End: 2022-05-06
Payer: COMMERCIAL

## 2022-05-06 DIAGNOSIS — R29.898 SHOULDER WEAKNESS: ICD-10-CM

## 2022-05-06 DIAGNOSIS — R29.3 POSTURE ABNORMALITY: Primary | ICD-10-CM

## 2022-05-06 PROCEDURE — 97110 THERAPEUTIC EXERCISES: CPT

## 2022-05-06 NOTE — PROGRESS NOTES
"[facility]   Physical Therapy Treatment Note     Name: Stella Tariq  Clinic Number: [id number]    Therapy Diagnosis:   Encounter Diagnoses   Name Primary?    Posture abnormality Yes    Shoulder weakness      Physician: Cuong Almendarez    Visit Date: 5/6/2022    Physician Orders: PT Eval and Treat   Medical Diagnosis from Referral: M48.02 (ICD-10-CM) - Spinal stenosis in cervical region  Evaluation Date: 3/31/2022  Authorization Period Expiration: 5/1/22  Plan of Care Expiration: 5/31/22  Progress Note Due: 4/31/22  Visit # / Visits authorized: 6/20  FOTO: 5/5     Precautions: HTN, no lifting >10#    PTA Visit #: 2/5     Time In: 10:00 am  Time Out: 1040am  Total Billable Time: 40 minutes    SUBJECTIVE     Pt reports: no new complaints.  She was compliant with home exercise program.  Response to previous treatment: better  Functional change: none    Pain: 0/10  Location: L [facility]    OBJECTIVE     Objective Measures updated at progress report unless specified.     Treatment     Stella received the treatments listed below:      therapeutic exercises to develop strength, endurance, ROM, flexibility and posture for 40  minutes including:    UBE 2 min <>  Seated cervical retractions x10   Rows GTB 10 X 3  Shoulder extension GTB 10 X 2  BUE external rotations with scap sets 5" holds 2X 10 c/ RTB   B upper trap stretch 2x30"   levator scap stretch 2x30" B   Supine bear hugs x10  Supine pec stretch on half foam 2'   Supine snow angels B x15      not performed    Supine SA punch 1# 2x10  B   Supine HSA c/ OTB 2x10   Supine Chin tuck lift x 10  Tball roll outs 10'' X 5  UE pulleys fle/abd 3' ea    manual therapy techniques:  were applied for 0 minutes, including: not performed    STM to cervical paraspinals, UT's, and LS's   TPR B levator scaps/ mid traps  upper trap stretch B 30" x 3 NP  Patient Education and Home Exercises     Home Exercises Provided and Patient Education Provided "     Education provided:   - HEP      Written Home Exercises Provided: Patient instructed to cont prior HEP. Exercises were reviewed and Stella was able to demonstrate them prior to the end of the session.  Stella demonstrated good  understanding of the education provided. See EMR under Patient Instructions for exercises provided during therapy sessions    ASSESSMENT   Pt  Presented to therapy with decreased neck pain and tension compared with previous visits. PT progressed resistance on shoulder external rotations and added foam roll for postural control. PT added cervical retractions with poor motor control demonstrated, even with tactile cues. Cervical stretches added with fair return demonstration; she  may require further cues for correct technique.       Stella Is progressing well towards her goals.   Pt prognosis is Good.     Pt will continue to benefit from skilled outpatient physical therapy to address the deficits listed in the problem list box on initial evaluation, provide pt/family education and to maximize pt's level of independence in the home and community environment.     Pt's spiritual, cultural and educational needs considered and pt agreeable to plan of care and goals.     Anticipated barriers to physical therapy: none    Goals: Short Term Goals: 6 visits  1. Pt will be compliant /c HEP to supplement PT in order to improve functional tasks  2. Pt will improve B marti-scaps MMTs to 3/5 grossly to improve strength for functional activities  3. Pt will improve thoracic rotation B to lacking 50% for improved mobility with functional activities      Long Term Goals: 12 visits   1. Pt will improve B marti-scaps MMTs to 3+/5 grossly to improve strength for functional activities  2. Pt will improve FOTO score to </= 49% limitation to reduce perceived pain with mobility   3. Pt will improve thoracic rotation to lacking 25% for improved mobility with functional activities     PLAN     Plan of care Certification:  3/31/2022 to 6/31/22     Outpatient Physical Therapy 2 times weekly for  12 visit to include the following interventions: Manual Therapy, Moist Heat/ Ice, Neuromuscular Re-ed, Patient Education, Self Care, Therapeutic Activities and Therapeutic Exercise, E-stim & Dry Needling as needed.     Nga Mcfadden, PT

## 2022-05-09 ENCOUNTER — CLINICAL SUPPORT (OUTPATIENT)
Dept: REHABILITATION | Facility: HOSPITAL | Age: 67
End: 2022-05-09
Payer: COMMERCIAL

## 2022-05-09 DIAGNOSIS — R29.898 SHOULDER WEAKNESS: ICD-10-CM

## 2022-05-09 DIAGNOSIS — R29.3 POSTURE ABNORMALITY: Primary | ICD-10-CM

## 2022-05-09 PROCEDURE — 97110 THERAPEUTIC EXERCISES: CPT

## 2022-05-09 NOTE — PROGRESS NOTES
[facility]   Physical Therapy Treatment Note     Name: Stella Tariq  Clinic Number: [id number]    Therapy Diagnosis:   Encounter Diagnoses   Name Primary?    Posture abnormality Yes    Shoulder weakness      Physician: Cuong Almendarez    Visit Date: 5/9/2022    Physician Orders: PT Eval and Treat   Medical Diagnosis from Referral: M48.02 (ICD-10-CM) - Spinal stenosis in cervical region  Evaluation Date: 3/31/2022  Authorization Period Expiration: 5/1/22  Plan of Care Expiration: 5/31/22  Progress Note Due: 4/31/22  Visit # / Visits authorized: 10/20  FOTO: 5/5     Precautions: HTN, no lifting >10#    PTA Visit #: 2/5     Time In: 10:22 am  Time Out: 11:15am  Total Billable Time: 53 minutes    SUBJECTIVE     Pt reports: she is doing much better, is able to sweep/mop her home without pain.she does still c/o weakness when carrying something heavy (ie laundry). She needs to check with her work to determine if she will schedule more PT visits.     She was compliant with home exercise program.  Response to previous treatment: better  Functional change: none    Pain: 0/10  Location: L UE    OBJECTIVE     Range of Motion/Strength:      CERVICAL AROM Pain/Dysfunction with Movement   Right rotation 60 deg Pain free   Left rotation 55 deg  Pain free      Thoracolumbar AROM Pain/Dysfunction with Movement   Right rotation 75% limited     Left rotation 75% limited        Shoulder Right MMT Left MMT Pain/Dysfunction with Movement (pain=!)   AROM             flexion  WFL 4-/5  WFL 3+/5 Painful left upper extremity AROM and MMT                  abduction  WFL 4-/5  WFL 3+/5     Internal rotation  WFL 3+/5  WFL 3/5     ER at 0° abd  WFL 4-/5  WFL 3/5     rhomboids   3-/5   3-/5     Mid trap   3-/5   3-/5     Lower trap    3-/5   3-/5        Elbow Right MMT Left MMT Pain/Dysfunction with Movement (pain=!)   AROM             flexion  WFL 4-/5  WFL 4-/5     extension  WFL 4-/5  WFL 4-/5    "       Treatment     Stella received the treatments listed below:      therapeutic exercises to develop strength, endurance, ROM, flexibility and posture for 53  minutes including:    UBE 2 min <>  Seated cervical retractions x10   Plantigrade    shoulder taps x15 B    SA push-ups 2x8  Supine bear hugs x10  Supine pec stretch on half foam 2'   Supine swimmers on 1/2 foam roll x10 B  Rows GTB 10 X 3  Supine SA punch 2# 3x10  B   Shoulder extension GTB 10 X 3  PNF D2 flex RTB BUE in standing x8      not performed        BUE external rotations with scap sets 5" holds 2X 10 c/ RTB   B upper trap stretch 2x30"   levator scap stretch 2x30" B   Supine snow angels B x15    Supine HSA c/ OTB 2x10   Supine Chin tuck lift x 10  Tball roll outs 10'' X 5  UE pulleys fle/abd 3' ea    manual therapy techniques:  were applied for 0 minutes, including: not performed    STM to cervical paraspinals, UT's, and LS's   TPR B levator scaps/ mid traps  upper trap stretch B 30" x 3 NP  Patient Education and Home Exercises     Home Exercises Provided and Patient Education Provided     Education provided:   - HEP      Written Home Exercises Provided: Patient instructed to cont prior HEP. Exercises were reviewed and Stella was able to demonstrate them prior to the end of the session.  Stella demonstrated good  understanding of the education provided. See EMR under Patient Instructions for exercises provided during therapy sessions    ASSESSMENT   Pt  Presents with reduced pain overall since starting PT. She demonstrates improving B marti-scap strength and reduced hypertonicity of B upper trap, along with subjective reports of reduced tension. PT progressed therex in weight bearing for improved scapular stability with carrying items at home. Pt would benefit from further skilled PT to address residual weakness affecting her ability to perform daily chores. She plans to speak with her work and decide if she has the time availability to schedule more " PT.     Stella Is progressing well towards her goals.   Pt prognosis is Good.     Pt will continue to benefit from skilled outpatient physical therapy to address the deficits listed in the problem list box on initial evaluation, provide pt/family education and to maximize pt's level of independence in the home and community environment.     Pt's spiritual, cultural and educational needs considered and pt agreeable to plan of care and goals.     Anticipated barriers to physical therapy: none    Goals: Short Term Goals: 6 visits  1. Pt will be compliant /c HEP to supplement PT in order to improve functional tasks  2. Pt will improve B marti-scaps MMTs to 3/5 grossly to improve strength for functional activities  3. Pt will improve thoracic rotation B to lacking 50% for improved mobility with functional activities      Long Term Goals: 12 visits   1. Pt will improve B marti-scaps MMTs to 3+/5 grossly to improve strength for functional activities  2. Pt will improve FOTO score to </= 49% limitation to reduce perceived pain with mobility   3. Pt will improve thoracic rotation to lacking 25% for improved mobility with functional activities     PLAN     Plan of care Certification: 3/31/2022 to 6/31/22     Outpatient Physical Therapy 2 times weekly for  12 visit to include the following interventions: Manual Therapy, Moist Heat/ Ice, Neuromuscular Re-ed, Patient Education, Self Care, Therapeutic Activities and Therapeutic Exercise, E-stim & Dry Needling as needed.     Nga Mcfadden, PT

## 2022-05-10 ENCOUNTER — DOCUMENTATION ONLY (OUTPATIENT)
Dept: REHABILITATION | Facility: HOSPITAL | Age: 67
End: 2022-05-10
Payer: COMMERCIAL

## 2022-05-10 NOTE — PROGRESS NOTES
PT/PTA met face to face to discuss pt's treatment plan and progress towards established goals. Pt will be seen by a physical therapist minimally every 6th visit or every 30 days.    Ab Cheney PTA     PT/PTA met face to face to discuss pt's treatment plan and progress towards established goals. Pt will be seen by a physical therapist minimally every 6th visit or every 30 days.    Jaz Cedillo PTA     Face to Face PTA Conference performed with Jza Cedillo PTA and Ab Cheney PTA regarding patient's current status, overall progress, and plan of care.    Nga Mcfadden, PT

## 2022-05-11 ENCOUNTER — CLINICAL SUPPORT (OUTPATIENT)
Dept: REHABILITATION | Facility: HOSPITAL | Age: 67
End: 2022-05-11
Payer: COMMERCIAL

## 2022-05-11 DIAGNOSIS — R29.898 SHOULDER WEAKNESS: ICD-10-CM

## 2022-05-11 DIAGNOSIS — R29.3 POSTURE ABNORMALITY: Primary | ICD-10-CM

## 2022-05-11 PROCEDURE — 97110 THERAPEUTIC EXERCISES: CPT | Mod: CQ

## 2022-05-11 PROCEDURE — 97140 MANUAL THERAPY 1/> REGIONS: CPT | Mod: CQ

## 2022-05-11 NOTE — PROGRESS NOTES
"[facility]   Physical Therapy Treatment Note     Name: Stella Tariq  Clinic Number: [id number]    Therapy Diagnosis:   Encounter Diagnoses   Name Primary?    Posture abnormality Yes    Shoulder weakness      Physician: Cuong Almendarez    Visit Date: 5/11/2022    Physician Orders: PT Eval and Treat   Medical Diagnosis from Referral: M48.02 (ICD-10-CM) - Spinal stenosis in cervical region  Evaluation Date: 3/31/2022  Authorization Period Expiration: 5/1/22  Plan of Care Expiration: 5/31/22  Progress Note Due: 4/31/22  Visit # / Visits authorized: 11/20  FOTO: 10/10     Precautions: HTN, no lifting >10#    PTA Visit #: 1/5     Time In: 1100 am  Time Out: 1145 am  Total Billable Time: 45 minutes    SUBJECTIVE     Pt reports: No c/o of pain, mostly tightness across her upper back.  She was compliant with home exercise program.  Response to previous treatment: better  Functional change: none    Pain: 0/10  Location: L [facility]    OBJECTIVE       Treatment     Stella received the treatments listed below:      therapeutic exercises to develop strength, endurance, ROM, flexibility and posture for 35 minutes including:    UBE 2 min <>  Seated cervical retractions x10   Plantigrade    shoulder taps x15 B    SA push-ups 2x8  Supine bear hugs x10  Supine pec stretch on half foam 2'   Supine swimmers on 1/2 foam roll x10 B  Rows GTB 10 X 3  Supine SA punch 2# 3x10  B   Shoulder extension GTB 10 X 3  PNF D2 flex RTB BUE in standing x8      not performed    BUE external rotations with scap sets 5" holds 2X 10 c/ RTB   B upper trap stretch 2x30"   levator scap stretch 2x30" B   Supine snow angels B x15    Supine HSA c/ OTB 2x10   Supine Chin tuck lift x 10  Tball roll outs 10'' X 5  UE pulleys fle/abd 3' ea    manual therapy techniques:  were applied for 10 minutes, including: not performed    STM to cervical paraspinals, UT's, and LS's   TPR B levator scaps/ mid traps NP  upper trap stretch B 30" x 3 " NP  Patient Education and Home Exercises     Home Exercises Provided and Patient Education Provided     Education provided:   - HEP      Written Home Exercises Provided: Patient instructed to cont prior HEP. Exercises were reviewed and Stella was able to demonstrate them prior to the end of the session.  Stella demonstrated good  understanding of the education provided. See EMR under Patient Instructions for exercises provided during therapy sessions    ASSESSMENT   Pt continues to show improvements with PT interventions. Stella continues with poor posture and B UT hypertonicity. Stella was provided with posture education going forward due to plans of independence from PT. Exercises continued for upper quarter strength and musculature imbalances causing excessive neck flexion. Stella exhibited good tolerance to therapeutic exercises. Pt with good response to STM to B UT and LS which improved inc'd cervical muscle tone at beginning of session.  Stella Is progressing well towards her goals.   Pt prognosis is Good.     Pt will continue to benefit from skilled outpatient physical therapy to address the deficits listed in the problem list box on initial evaluation, provide pt/family education and to maximize pt's level of independence in the home and community environment.     Pt's spiritual, cultural and educational needs considered and pt agreeable to plan of care and goals.     Anticipated barriers to physical therapy: none    Goals: Short Term Goals: 6 visits  1. Pt will be compliant /c HEP to supplement PT in order to improve functional tasks  2. Pt will improve B marti-scaps MMTs to 3/5 grossly to improve strength for functional activities  3. Pt will improve thoracic rotation B to lacking 50% for improved mobility with functional activities      Long Term Goals: 12 visits   1. Pt will improve B marti-scaps MMTs to 3+/5 grossly to improve strength for functional activities  2. Pt will improve FOTO score to </= 49%  limitation to reduce perceived pain with mobility   3. Pt will improve thoracic rotation to lacking 25% for improved mobility with functional activities     PLAN     Plan of care Certification: 3/31/2022 to 6/31/22     Outpatient Physical Therapy 2 times weekly for  12 visit to include the following interventions: Manual Therapy, Moist Heat/ Ice, Neuromuscular Re-ed, Patient Education, Self Care, Therapeutic Activities and Therapeutic Exercise, E-stim & Dry Needling as needed.     Ab Cheney, PTA